# Patient Record
Sex: MALE | Race: WHITE | NOT HISPANIC OR LATINO | Employment: UNEMPLOYED | URBAN - METROPOLITAN AREA
[De-identification: names, ages, dates, MRNs, and addresses within clinical notes are randomized per-mention and may not be internally consistent; named-entity substitution may affect disease eponyms.]

---

## 2024-10-10 ENCOUNTER — HOSPITAL ENCOUNTER (EMERGENCY)
Facility: HOSPITAL | Age: 29
Discharge: HOME | End: 2024-10-10
Attending: EMERGENCY MEDICINE

## 2024-10-10 VITALS
OXYGEN SATURATION: 99 % | HEIGHT: 72 IN | DIASTOLIC BLOOD PRESSURE: 81 MMHG | RESPIRATION RATE: 18 BRPM | BODY MASS INDEX: 23.03 KG/M2 | SYSTOLIC BLOOD PRESSURE: 121 MMHG | WEIGHT: 170 LBS | HEART RATE: 87 BPM | TEMPERATURE: 100 F

## 2024-10-10 DIAGNOSIS — S90.829A FRICTION BLISTERS OF THE SOLES, UNSPECIFIED LATERALITY, INITIAL ENCOUNTER: Primary | ICD-10-CM

## 2024-10-10 PROCEDURE — 99282 EMERGENCY DEPT VISIT SF MDM: CPT

## 2024-10-11 ENCOUNTER — HOSPITAL ENCOUNTER (EMERGENCY)
Facility: HOSPITAL | Age: 29
Discharge: HOME | End: 2024-10-11
Attending: EMERGENCY MEDICINE

## 2024-10-11 VITALS
DIASTOLIC BLOOD PRESSURE: 72 MMHG | OXYGEN SATURATION: 100 % | BODY MASS INDEX: 23.03 KG/M2 | RESPIRATION RATE: 16 BRPM | SYSTOLIC BLOOD PRESSURE: 109 MMHG | TEMPERATURE: 98 F | HEIGHT: 72 IN | WEIGHT: 170 LBS | HEART RATE: 74 BPM

## 2024-10-11 DIAGNOSIS — Z76.5 MALINGERING: ICD-10-CM

## 2024-10-11 DIAGNOSIS — Z59.00 HOMELESS SINGLE PERSON: Primary | ICD-10-CM

## 2024-10-11 PROCEDURE — 99283 EMERGENCY DEPT VISIT LOW MDM: CPT

## 2024-10-11 SDOH — SOCIAL DETERMINANTS OF HEALTH (SDOH): HOMELESSNESS UNSPECIFIED: Z59.00

## 2024-10-11 NOTE — DISCHARGE INSTRUCTIONS
Try wearing a different shoe to decrease the pressure and friction on the back of your heels.     You can apply a thick bandage with some padding to help as well.

## 2024-10-11 NOTE — ED NOTES
"Patient reports being delusional. Patient states he is hearing things and "he just knows he is suppose to be here and he knows the truth". Patient denies any pain at this time. Patient states " right is right and wrong is wrong". Patient is laying in bed with sheet over his head at this time. Patient denies SI/HI at this time.   "

## 2024-10-11 NOTE — ED PROVIDER NOTES
"Encounter Date: 10/11/2024       History     Chief Complaint   Patient presents with    Repeat Visit     Patient malingering in parking lot, escorted off property by security, called 911 stating having hallucinations. In earlier stating had no home, no way to get home and having foot blisters. Patient stating in triage this time "I don't know what's right or what's wrong, I've done something to piss them off". When asked "who is they?" Patient states "I don't know." Denies SI/HI.     HPI    28-year-old male presents the ER for evaluation hallucinations.  Patient was seen evaluated earlier today for blisters subsequently discharged.  He was escorted off the property by security and then soon afterwards he called EMS endorses hallucinations.  I went to evaluate patient resting in bed no acute distress no HI SI.  I asked him frankly if he was truly having hallucinations or if he needs a place to stay he reports that he would like a place to stay.    Review of patient's allergies indicates:  No Known Allergies  History reviewed. No pertinent past medical history.  History reviewed. No pertinent surgical history.  No family history on file.  Social History     Tobacco Use    Smoking status: Former     Types: Cigarettes    Smokeless tobacco: Never   Substance Use Topics    Alcohol use: Yes     Comment: socially    Drug use: Not Currently     Types: Amphetamines     Review of Systems   Psychiatric/Behavioral:  Negative for self-injury and suicidal ideas. The patient is not hyperactive.    All other systems reviewed and are negative.      Physical Exam     Initial Vitals [10/11/24 0046]   BP Pulse Resp Temp SpO2   124/69 93 18 98.4 °F (36.9 °C) 99 %      MAP       --         Physical Exam    Vitals reviewed.  Constitutional: He appears well-developed.   Disheveled   HENT:   Head: Normocephalic and atraumatic.   Eyes: Pupils are equal, round, and reactive to light.   Neck:   Normal range of motion.  Cardiovascular:  Normal " rate and regular rhythm.           Pulmonary/Chest: No respiratory distress.   Musculoskeletal:         General: Normal range of motion.      Cervical back: Normal range of motion.     Neurological: He is alert and oriented to person, place, and time. He has normal strength. GCS score is 15. GCS eye subscore is 4. GCS verbal subscore is 5. GCS motor subscore is 6.   Skin: Skin is warm and dry. Capillary refill takes less than 2 seconds.   Psychiatric:   Standoffish,  not aggressive or violent , he is directable         ED Course   Procedures  Labs Reviewed - No data to display       Imaging Results    None          Medications - No data to display  Medical Decision Making  28-year-old male presents the ER for malingering need a place to stay.  He was seen evaluated earlier today subsequently discharged.  He called EMS reports that he was having hallucinations.  I went to evaluate patient I asked him frankly if he was truly having hallucinations or if needed a place to stay reported that he had a place to stay.  There was no HI or SI he is directable, not violent or aggressive.  No indication for pec.  Discussed with patient will plan to let him rest in the emergency department and will discharge in the morning will give case management/social work referrals as well as referral to shelters.  No indication for workup.  Patient to be discharged in the    Amount and/or Complexity of Data Reviewed  External Data Reviewed: notes.                                      Clinical Impression:  Final diagnoses:  [Z59.00] Homeless single person (Primary)  [Z76.5] Malingering          ED Disposition Condition    Discharge Stable          ED Prescriptions    None       Follow-up Information       Follow up With Specialties Details Why Contact Info Additional Information    [facility] Psychiat[facility] Schedule an appointment as soon as possible for a visit  As needed [address]  [facility]  [address]  [phone] [facility] 4th Floor, [address] [facility] in [facility] and use [facility] Medical Office elevator             Smita Vigil MD  10/11/24 3176

## 2024-10-11 NOTE — DISCHARGE INSTRUCTIONS
Thank you for coming in to see us at [facility] It was nice to meet you, and I hope you feel better soon. Please feel free to return to the ER at any time should your symptoms get worse, or if you have different emergent concerns.    Our goal in the emergency department is to always give you outstanding care and exceptional service. You may receive a survey by mail or e-mail in the next week regarding your experience in our ED. We would greatly appreciate your completing and returning the survey. Your feedback provides us with a way to recognize our staff who give very good care and it helps us learn how to improve when your experience was below our aspiration of excellence.      It is important to remember that some problems or medical conditions are difficult to diagnose and may not be found or addressed during your Emergency Department visit.  These conditions often start with non-specific symptoms and can only be diagnosed on follow up visits with your primary care physician or specialist when the symptoms continue or change. Please remember that all medical conditions can change, and we cannot predict how you will be feeling tomorrow or the next day.    Return to the ER with any questions/concerns, new/concerning symptoms, worsening, failure to improve or inability to obtain follow-up.       Be sure to follow up with your primary care doctor and review all labs/imaging/tests that were performed during your ER visit with them. It is very common for us to identify non-emergent incidental findings which must be followed up with your primary care physician.  Some labs/imaging/tests may be outside of the normal range, and require non-emergent follow-up and/or further investigation/treatment/procedures/testing to help diagnose/exclude/prevent complications or other potentially serious medical conditions. Some abnormalities may not have been discussed or addressed during your ER visit. Some lab  results may not return during your ER visit but can be accessible by downloading the free [facility] teddy or by visiting [email or web address] . It is important for you to review all labs/imaging/tests which are outside of the normal range with your physician.    An ER visit does not replace a primary care visit, and many screening tests or follow-up tests cannot be ordered by an ER doctor or performed by the ER. Some tests may even require pre-approval.    If you do not have a primary care doctor, you may contact the one listed on your discharge paperwork or you may also call the [facility] Appointment Desk at [phone] , or [employer] at  [phone] to schedule an appointment, or establish care with a primary care doctor or even a specialist and to obtain information about local resources. It is important to your health that you have a primary care doctor.    Please take all medications as directed. We have done our best to select a medication for you that will treat your condition however, all medications may potentially have side-effects and it is impossible to predict which medications may give you side-effects or what those side-effects (if any) those medications may give you.  If you feel that you are having a negative effect or side-effect of any medication you should stop taking those medications immediately and seek medical attention. If you feel that you are having a life-threatening reaction call 911.        Do not drive, swim, climb to height, take a bath, operate heavy machinery, drink alcohol or take potentially sedating medications, sign any legal documents or make any important decisions for 24 hours if you have received any pain medications, sedatives or mood altering drugs during your ER visit or within 24 hours of taking them if they have been prescribed to you.     You can find additional resources for Dentists, hearing aids, durable medical equipment, low cost pharmacies and  other resources at [email or web address]

## 2024-10-11 NOTE — ED NOTES
Patient admitted with c/o nausea, blisters on foot. Patient is conscious, coherent, alert, oriented and responding to verbal commands. Vitals stable.

## 2024-10-11 NOTE — ED NOTES
"This RN asked patient to remove one arm from sleeve to obtain B/P. Patient proceeds to remove arm from sleeve by self and forcefully ripping his sweatshirt at this time. Patient reports that was an accident and stated he "did not mean to do that".   "

## 2024-10-11 NOTE — ED NOTES
Patient given D/C instructions at this time. Patient verbalized understanding. Patient requesting to use phone at this time. Phone given to patient at this.

## 2024-10-12 NOTE — ED PROVIDER NOTES
Encounter Date: 10/10/2024       History     Chief Complaint   Patient presents with    Multiple Complaints     Patient states having foot blisters from working on feet, for a few days. Dirt noted to feet in triage. Patient states feeling nauseous regularly, denies vomiting. Denies blood in urine/stool/emesis. Dorsal pedis pulses even and palpable, no discoloration observed.     Dominick Fall is a 28 y.o. male who has no past medical history on file. presenting to the Emergency Department for blisters.  Patient reports he has been doing a lot of walking.  He wears a steel toe clog and has developed blood blisters to bilateral posterior heel.  There has been no drainage from the site.  He also reports he feels nauseous regularly, however reports his definition of nausea is feeling anxious and jittery. He does not feel this way now. He has not felt the need to vomit.  No vomiting. No abdominal pain. No chest pain, shortness of breath, fever, malaise.  He is noted to have poor hygiene, reports he is homeless and has been staying at shelters. Endorses use of methamphetamine. No SI, HI, AVH.         The history is provided by the patient.     Review of patient's allergies indicates:  No Known Allergies  History reviewed. No pertinent past medical history.  History reviewed. No pertinent surgical history.  No family history on file.  Social History     Tobacco Use    Smoking status: Former     Types: Cigarettes    Smokeless tobacco: Never   Substance Use Topics    Alcohol use: Yes     Comment: socially    Drug use: Not Currently     Types: Amphetamines     Review of Systems   Constitutional:  Negative for chills and fever.   HENT:  Negative for congestion, drooling and ear discharge.    Eyes:  Negative for visual disturbance.   Respiratory:  Negative for shortness of breath.    Cardiovascular:  Negative for chest pain.   Gastrointestinal:  Negative for abdominal pain, nausea and vomiting.   Genitourinary:  Negative  for dysuria.   Skin:  Positive for wound.   Neurological:  Negative for dizziness, light-headedness and headaches.   Psychiatric/Behavioral:  Negative for agitation, confusion and suicidal ideas. The patient is nervous/anxious (sometimes, not now).        Physical Exam     Initial Vitals [10/10/24 2137]   BP Pulse Resp Temp SpO2   121/81 87 18 99.5 °F (37.5 °C) 99 %      MAP       --         Physical Exam    Nursing note and vitals reviewed.  Constitutional: He appears well-developed and well-nourished. He is not diaphoretic.  Non-toxic appearance. No distress.   HENT:   Head: Normocephalic and atraumatic.   Right Ear: External ear normal.   Left Ear: External ear normal.   Eyes: EOM are normal.   Neck: Neck supple.   Normal range of motion.  Cardiovascular:  Normal rate and regular rhythm.           Pulmonary/Chest: Breath sounds normal. No respiratory distress. He has no wheezes.   Abdominal: Abdomen is soft. He exhibits no distension. There is no abdominal tenderness. There is no rebound.   Musculoskeletal:         General: Normal range of motion.      Cervical back: Normal range of motion and neck supple.     Neurological: He is alert and oriented to person, place, and time. GCS score is 15. GCS eye subscore is 4. GCS verbal subscore is 5. GCS motor subscore is 6.   Skin: Skin is dry.   Approximately 1 cm spherical friction/blood blister to bilateral posterior heel.  No erythema, purulence or signs of infection.     Poor hygiene. Dirt noted to bilateral feet and interdigital spaces.   Psychiatric: He has a normal mood and affect. His behavior is normal. Judgment and thought content normal.   Directable, pleasant. Linear thought process.         ED Course   Procedures  Labs Reviewed - No data to display       Imaging Results    None          Medications - No data to display  Medical Decision Making  Well-appearing 28-year-old male presents with friction blisters to bilateral heel.  He denies any other acute  symptoms.  He is homeless and endorses methamphetamine use.  He denies SI, HI, AVH.  He is not gravely disabled.  He has a linear thought process and is directable. Denies any medical history. He has been staying at homeless shelters.  No indication for emergent workup.  Recommend patient apply a bandage/bunion pad to the friction blisters. Advised against popping them as the skin is a natural barrier.  He is agreeable to this plan and all questions were answered.  He is stable for discharge at this time.  ED return precautions discussed    Friction blister, blood blister, cellulitis, planter's wart, abrasion, contusion    Problems Addressed:  Friction blisters of the soles, unspecified laterality, initial encounter: acute illness or injury                                      Clinical Impression:  Final diagnoses:  [K84.175R] Friction blisters of the soles, unspecified laterality, initial encounter (Primary)          ED Disposition Condition    Discharge Stable          ED Prescriptions    None       Follow-up Information       Follow up With Specialties Details Why Contact Info Additional Information    [facility] Family Medicine Schedule an appointment as soon as possible for a visit in 2 days  [address], [address]  [facility] [address] [phone] Please park in [address] or [address] and use [address]. Take Medical Office Bldg. elevators.    [facility] Emergency Dept Emergency Medicine Go to  If symptoms worsen [address] [facility] [address] [phone]              Kristen Nance PA-C  10/12/24 0231